# Patient Record
Sex: MALE | Race: WHITE | Employment: FULL TIME
[De-identification: names, ages, dates, MRNs, and addresses within clinical notes are randomized per-mention and may not be internally consistent; named-entity substitution may affect disease eponyms.]

---

## 2023-07-04 ENCOUNTER — APPOINTMENT (OUTPATIENT)
Facility: HOSPITAL | Age: 28
DRG: 300 | End: 2023-07-04

## 2023-07-04 ENCOUNTER — HOSPITAL ENCOUNTER (INPATIENT)
Facility: HOSPITAL | Age: 28
LOS: 1 days | Discharge: HOME | DRG: 300 | End: 2023-07-05
Attending: EMERGENCY MEDICINE | Admitting: INTERNAL MEDICINE

## 2023-07-04 DIAGNOSIS — I82.411 ACUTE DEEP VEIN THROMBOSIS (DVT) OF FEMORAL VEIN OF RIGHT LOWER EXTREMITY (HCC): Primary | ICD-10-CM

## 2023-07-04 LAB
ANION GAP SERPL CALC-SCNC: 5 MMOL/L (ref 5–15)
APTT PPP: 27.1 SEC (ref 22.1–31)
BUN SERPL-MCNC: 17 MG/DL (ref 6–20)
BUN/CREAT SERPL: 11 (ref 12–20)
CALCIUM SERPL-MCNC: 9.2 MG/DL (ref 8.5–10.1)
CHLORIDE SERPL-SCNC: 104 MMOL/L (ref 97–108)
CO2 SERPL-SCNC: 28 MMOL/L (ref 21–32)
COMMENT:: NORMAL
CREAT SERPL-MCNC: 1.49 MG/DL (ref 0.7–1.3)
ECHO BSA: 2.17 M2
ERYTHROCYTE [DISTWIDTH] IN BLOOD BY AUTOMATED COUNT: 13.4 % (ref 11.5–14.5)
GLUCOSE SERPL-MCNC: 115 MG/DL (ref 65–100)
HCT VFR BLD AUTO: 41.4 % (ref 36.6–50.3)
HGB BLD-MCNC: 13.4 G/DL (ref 12.1–17)
INR PPP: 1.1 (ref 0.9–1.1)
MCH RBC QN AUTO: 30.9 PG (ref 26–34)
MCHC RBC AUTO-ENTMCNC: 32.4 G/DL (ref 30–36.5)
MCV RBC AUTO: 95.4 FL (ref 80–99)
NRBC # BLD: 0 K/UL (ref 0–0.01)
NRBC BLD-RTO: 0 PER 100 WBC
NT PRO BNP: 122 PG/ML
PLATELET # BLD AUTO: 168 K/UL (ref 150–400)
PMV BLD AUTO: 9.9 FL (ref 8.9–12.9)
POTASSIUM SERPL-SCNC: 4.4 MMOL/L (ref 3.5–5.1)
PROTHROMBIN TIME: 11.3 SEC (ref 9–11.1)
RBC # BLD AUTO: 4.34 M/UL (ref 4.1–5.7)
SODIUM SERPL-SCNC: 137 MMOL/L (ref 136–145)
SPECIMEN HOLD: NORMAL
THERAPEUTIC RANGE: NORMAL SECS (ref 58–77)
TROPONIN I SERPL HS-MCNC: 9 NG/L (ref 0–76)
UFH PPP CHRO-ACNC: 1.07 IU/ML
UFH PPP CHRO-ACNC: <0.1 IU/ML
WBC # BLD AUTO: 12.3 K/UL (ref 4.1–11.1)

## 2023-07-04 PROCEDURE — 2580000003 HC RX 258: Performed by: INTERNAL MEDICINE

## 2023-07-04 PROCEDURE — 85520 HEPARIN ASSAY: CPT

## 2023-07-04 PROCEDURE — 80048 BASIC METABOLIC PNL TOTAL CA: CPT

## 2023-07-04 PROCEDURE — 93971 EXTREMITY STUDY: CPT

## 2023-07-04 PROCEDURE — 1100000003 HC PRIVATE W/ TELEMETRY

## 2023-07-04 PROCEDURE — 85730 THROMBOPLASTIN TIME PARTIAL: CPT

## 2023-07-04 PROCEDURE — 84484 ASSAY OF TROPONIN QUANT: CPT

## 2023-07-04 PROCEDURE — 83880 ASSAY OF NATRIURETIC PEPTIDE: CPT

## 2023-07-04 PROCEDURE — 85027 COMPLETE CBC AUTOMATED: CPT

## 2023-07-04 PROCEDURE — 6360000002 HC RX W HCPCS: Performed by: INTERNAL MEDICINE

## 2023-07-04 PROCEDURE — 36415 COLL VENOUS BLD VENIPUNCTURE: CPT

## 2023-07-04 PROCEDURE — 85610 PROTHROMBIN TIME: CPT

## 2023-07-04 PROCEDURE — 99285 EMERGENCY DEPT VISIT HI MDM: CPT

## 2023-07-04 RX ORDER — ACETAMINOPHEN 650 MG/1
650 SUPPOSITORY RECTAL EVERY 6 HOURS PRN
Status: DISCONTINUED | OUTPATIENT
Start: 2023-07-04 | End: 2023-07-05 | Stop reason: HOSPADM

## 2023-07-04 RX ORDER — ONDANSETRON 2 MG/ML
4 INJECTION INTRAMUSCULAR; INTRAVENOUS EVERY 6 HOURS PRN
Status: DISCONTINUED | OUTPATIENT
Start: 2023-07-04 | End: 2023-07-05 | Stop reason: HOSPADM

## 2023-07-04 RX ORDER — HEPARIN SODIUM 10000 [USP'U]/100ML
18-30 INJECTION, SOLUTION INTRAVENOUS CONTINUOUS
Status: DISCONTINUED | OUTPATIENT
Start: 2023-07-04 | End: 2023-07-05

## 2023-07-04 RX ORDER — HEPARIN SODIUM 1000 [USP'U]/ML
80 INJECTION, SOLUTION INTRAVENOUS; SUBCUTANEOUS ONCE
Status: COMPLETED | OUTPATIENT
Start: 2023-07-04 | End: 2023-07-04

## 2023-07-04 RX ORDER — POLYETHYLENE GLYCOL 3350 17 G/17G
17 POWDER, FOR SOLUTION ORAL DAILY PRN
Status: DISCONTINUED | OUTPATIENT
Start: 2023-07-04 | End: 2023-07-05 | Stop reason: HOSPADM

## 2023-07-04 RX ORDER — SODIUM CHLORIDE 9 MG/ML
INJECTION, SOLUTION INTRAVENOUS PRN
Status: DISCONTINUED | OUTPATIENT
Start: 2023-07-04 | End: 2023-07-05 | Stop reason: HOSPADM

## 2023-07-04 RX ORDER — HEPARIN SODIUM 1000 [USP'U]/ML
80 INJECTION, SOLUTION INTRAVENOUS; SUBCUTANEOUS PRN
Status: DISCONTINUED | OUTPATIENT
Start: 2023-07-04 | End: 2023-07-05

## 2023-07-04 RX ORDER — ONDANSETRON 4 MG/1
4 TABLET, ORALLY DISINTEGRATING ORAL EVERY 8 HOURS PRN
Status: DISCONTINUED | OUTPATIENT
Start: 2023-07-04 | End: 2023-07-05 | Stop reason: HOSPADM

## 2023-07-04 RX ORDER — SODIUM CHLORIDE 0.9 % (FLUSH) 0.9 %
5-40 SYRINGE (ML) INJECTION EVERY 12 HOURS SCHEDULED
Status: DISCONTINUED | OUTPATIENT
Start: 2023-07-04 | End: 2023-07-05 | Stop reason: HOSPADM

## 2023-07-04 RX ORDER — SODIUM CHLORIDE 0.9 % (FLUSH) 0.9 %
5-40 SYRINGE (ML) INJECTION PRN
Status: DISCONTINUED | OUTPATIENT
Start: 2023-07-04 | End: 2023-07-05 | Stop reason: HOSPADM

## 2023-07-04 RX ORDER — ACETAMINOPHEN 325 MG/1
650 TABLET ORAL EVERY 6 HOURS PRN
Status: DISCONTINUED | OUTPATIENT
Start: 2023-07-04 | End: 2023-07-05 | Stop reason: HOSPADM

## 2023-07-04 RX ORDER — DEXTROAMPHETAMINE SACCHARATE, AMPHETAMINE ASPARTATE, DEXTROAMPHETAMINE SULFATE AND AMPHETAMINE SULFATE 5; 5; 5; 5 MG/1; MG/1; MG/1; MG/1
20 TABLET ORAL 3 TIMES DAILY
COMMUNITY

## 2023-07-04 RX ORDER — HEPARIN SODIUM 1000 [USP'U]/ML
40 INJECTION, SOLUTION INTRAVENOUS; SUBCUTANEOUS PRN
Status: DISCONTINUED | OUTPATIENT
Start: 2023-07-04 | End: 2023-07-05

## 2023-07-04 RX ADMIN — HEPARIN SODIUM AND DEXTROSE 18 UNITS/KG/HR: 10000; 5 INJECTION INTRAVENOUS at 17:05

## 2023-07-04 RX ADMIN — SODIUM CHLORIDE, PRESERVATIVE FREE 10 ML: 5 INJECTION INTRAVENOUS at 21:00

## 2023-07-04 RX ADMIN — HEPARIN SODIUM 8000 UNITS: 1000 INJECTION INTRAVENOUS; SUBCUTANEOUS at 17:05

## 2023-07-04 ASSESSMENT — PAIN SCALES - GENERAL
PAINLEVEL_OUTOF10: 8
PAINLEVEL_OUTOF10: 8
PAINLEVEL_OUTOF10: 7
PAINLEVEL_OUTOF10: 7
PAINLEVEL_OUTOF10: 8

## 2023-07-04 ASSESSMENT — ENCOUNTER SYMPTOMS: SHORTNESS OF BREATH: 0

## 2023-07-04 NOTE — ED NOTES
Verbal shift change report given to [address] [place] [address] (oncoming nurse) by Nieves Reyna (offgoing nurse). Report included the following information Nurse Handoff Report, ED Encounter Summary, ED SBAR, [facility], Recent Results, and Cardiac Rhythm NSR . ED RN to draw pre-heparin labs and initiate heparin gtt prior to transport. Confirmed with Kyle Daniel in pharmacy to give 8,000 u bolus of heparin and initiate gtt at 18 units/kg/hr.        Acosta Rodriguez, RN  07/04/23 [id number] Servando Arora RN  07/04/23 1006

## 2023-07-04 NOTE — ED PROVIDER NOTES
EMERGENCY DEPARTMENT HISTORY AND PHYSICAL EXAM    Date: 7/4/2023  Patient Name: Wan Diaz  Patient Age and Sex: 33 y.o. male  MRN:  [id number]  CSN:  [id number]    History of Present Illness     Chief Complaint   Patient presents with    Leg Swelling     Noticed swelling, tightness and tenderness to R thigh last night; reports family hx blood clots; denies trauma/injury       History Provided By: Patient    Ability to gather history was limited by:     HPI: Wan Diaz, 33 y.o. male   With family history of DVT including in his brother and his father, complains of swelling throughout the right leg which started about 1 day ago, now involving the entire right leg, which feels swollen and firm. Mild pain. No shortness of breath. No recent injuries or any DVT risk factors other than family history. Tobacco Use      Smoking status: Some Days        Types: Cigarettes      Smokeless tobacco: Never     Past History   The patient's medical, surgical, and social history were reviewed by me today. Current Medications:  No current facility-administered medications on file prior to encounter. Current Outpatient Medications on File Prior to Encounter   Medication Sig Dispense Refill    amphetamine-dextroamphetamine (ADDERALL) 20 MG tablet Take 1 tablet by mouth 3 times daily. Max Daily Amount: 60 mg         No past medical history on file. No past surgical history on file. Social History     Tobacco Use    Smoking status: Some Days     Types: Cigarettes    Smokeless tobacco: Never   Substance Use Topics    Alcohol use: Yes     Alcohol/week: 9.0 standard drinks     Types: 9 Cans of beer per week    Drug use: Never       Allergies:  No Known Allergies  Review of Systems   A Review of Systems was reviewed by me today during this encounter. Pertinent positive and negative elements are noted in the HPI and MDM sections. Review of Systems   Respiratory:  Negative for shortness of breath.

## 2023-07-04 NOTE — PROGRESS NOTES
Patient transferred to [facility] from [address] [place]. Patient currently refused CTA due to no active insurance. Patient would like to talk with md about scan prior to getting it completed out of pocket. MD notified and will talk to him. 1810: Rate verified the heparin drip at 18 units/ kg/ hr.     Patient currently resting in bed. VSS. Reports pain of 8/10 but declined analgesia at this time. Patient sister bringing food for him for dinner. Patient requested to continue his home adderall prescription. Patient sister willing to bring home supply to hospital for him. Patient hoping to avoid withdrawal symptoms from not having it. Notified MD.     1763: Went into patient room to begin admission database. Patient and sister just now starting to eat dinner. Sister offered to leave room, I said there is no rush, we can complete it later. End of Shift Note    Bedside shift change report given to [address] [place] [address] (oncoming nurse) by Elizabeth Lloyd RN (offgoing nurse). Report included the following information SBAR, MAR, Recent Results, and Cardiac Rhythm NSR    Shift worked:  7a-7p     Shift summary and any significant changes:     See above     Concerns for physician to address:  Patient declined CTA due to no current insurance coverage, would like to talk with md before completing it out of pocket. Patient would like to continue his adderall rx during hospital stay if possible. Zone phone for oncoming shift:          Activity:     Number times ambulated in hallways past shift: 0  Number of times OOB to chair past shift: 0    Cardiac:   Cardiac Monitoring: Yes           Access:  Current line(s): PIV     Genitourinary:   Urinary status: voiding    Respiratory:      Chronic home O2 use?: NO  Incentive spirometer at bedside: NO       GI:     Current diet:  ADULT DIET;  Regular  Passing flatus: YES  Tolerating current diet: YES       Pain Management:   Patient states pain is manageable on current regimen: YES    Skin:

## 2023-07-04 NOTE — H&P
Hospitalist Admission Note    NAME:   Wan Diaz   : 1995   MRN: [id number]     Date/Time: 2023 4:09 PM    Patient PCP: None None    ______________________________________________________________________  Given the patient's current clinical presentation, I have a high level of concern for decompensation if discharged from the emergency department. Complex decision making was performed, which includes reviewing the patient's available past medical records, laboratory results, and x-ray films. My assessment of this patient's clinical condition and my plan of care is as follows. Assessment / Plan:    Acute right leg DVT unprovoked  Family history of factor V Leyden mutation with DVTs in multiple members of the family  -Ultrasound of right leg positive for DVT. -We will check a stat CBC, CMP, troponin and also proBNP  -We will check CT angiogram of the chest as well  -Start heparin drip per protocol. Pharmacy consulted. -Patient will need a hypercoagulable panel in about 6 weeks times with hematology. We will consult hematology as well.  -He was counseled extensively regarding smoking cessation as it could increase the risk of blood clots    ADHD  -On Adderall at home        Medical Decision Making:   I personally reviewed labs: CBC, BMP  I personally reviewed imaging: Ultrasound Doppler of right leg  I personally reviewed EKG:  Toxic drug monitoring: Monitor hemoglobin while on heparin drip  Discussed case with: ED provider. After discussion I am in agreement that acuity of patient's medical condition necessitates hospital stay.       Code Status: Full code  DVT Prophylaxis: Heparin drip  GI Prophylaxis:  Baseline: From home, independent of ADLs    Subjective:   CHIEF COMPLAINT: Right leg pain and swelling    HISTORY OF PRESENT ILLNESS:     Wan Diaz is a 33 y.o.  male with PMHx significant for family history of factor V Kristen Florian is coming the hospital chief complaints of an onset of

## 2023-07-04 NOTE — ED NOTES
RN to CT to place secondary line for CTA. Pt requested to ask physician about CT scan as he is concerned because he does not currently have active insurance. CT delayed scan at this time.       Sedrick Goldman RN  07/04/23 3534

## 2023-07-05 VITALS
OXYGEN SATURATION: 94 % | TEMPERATURE: 98.8 F | SYSTOLIC BLOOD PRESSURE: 135 MMHG | WEIGHT: 220 LBS | BODY MASS INDEX: 34.53 KG/M2 | HEIGHT: 67 IN | HEART RATE: 90 BPM | DIASTOLIC BLOOD PRESSURE: 94 MMHG | RESPIRATION RATE: 18 BRPM

## 2023-07-05 LAB
ANION GAP SERPL CALC-SCNC: 6 MMOL/L (ref 5–15)
BASOPHILS # BLD: 0.1 K/UL (ref 0–0.1)
BASOPHILS NFR BLD: 1 % (ref 0–1)
BUN SERPL-MCNC: 16 MG/DL (ref 6–20)
BUN/CREAT SERPL: 11 (ref 12–20)
CALCIUM SERPL-MCNC: 9.1 MG/DL (ref 8.5–10.1)
CHLORIDE SERPL-SCNC: 104 MMOL/L (ref 97–108)
CO2 SERPL-SCNC: 24 MMOL/L (ref 21–32)
CREAT SERPL-MCNC: 1.45 MG/DL (ref 0.7–1.3)
DIFFERENTIAL METHOD BLD: ABNORMAL
EOSINOPHIL # BLD: 0.4 K/UL (ref 0–0.4)
EOSINOPHIL NFR BLD: 3 % (ref 0–7)
ERYTHROCYTE [DISTWIDTH] IN BLOOD BY AUTOMATED COUNT: 13.7 % (ref 11.5–14.5)
GLUCOSE SERPL-MCNC: 115 MG/DL (ref 65–100)
HCT VFR BLD AUTO: 41.2 % (ref 36.6–50.3)
HGB BLD-MCNC: 13.2 G/DL (ref 12.1–17)
IMM GRANULOCYTES # BLD AUTO: 0.2 K/UL (ref 0–0.04)
IMM GRANULOCYTES NFR BLD AUTO: 2 % (ref 0–0.5)
LYMPHOCYTES # BLD: 1.8 K/UL (ref 0.8–3.5)
LYMPHOCYTES NFR BLD: 15 % (ref 12–49)
MCH RBC QN AUTO: 30.5 PG (ref 26–34)
MCHC RBC AUTO-ENTMCNC: 32 G/DL (ref 30–36.5)
MCV RBC AUTO: 95.2 FL (ref 80–99)
MONOCYTES # BLD: 1.5 K/UL (ref 0–1)
MONOCYTES NFR BLD: 12 % (ref 5–13)
NEUTS SEG # BLD: 8.4 K/UL (ref 1.8–8)
NEUTS SEG NFR BLD: 67 % (ref 32–75)
NRBC # BLD: 0 K/UL (ref 0–0.01)
NRBC BLD-RTO: 0 PER 100 WBC
PLATELET # BLD AUTO: 177 K/UL (ref 150–400)
PMV BLD AUTO: 10.1 FL (ref 8.9–12.9)
POTASSIUM SERPL-SCNC: 4.1 MMOL/L (ref 3.5–5.1)
RBC # BLD AUTO: 4.33 M/UL (ref 4.1–5.7)
SODIUM SERPL-SCNC: 134 MMOL/L (ref 136–145)
UFH PPP CHRO-ACNC: 0.39 IU/ML
WBC # BLD AUTO: 12.3 K/UL (ref 4.1–11.1)

## 2023-07-05 PROCEDURE — 2580000003 HC RX 258: Performed by: INTERNAL MEDICINE

## 2023-07-05 PROCEDURE — 6370000000 HC RX 637 (ALT 250 FOR IP): Performed by: INTERNAL MEDICINE

## 2023-07-05 PROCEDURE — 36415 COLL VENOUS BLD VENIPUNCTURE: CPT

## 2023-07-05 PROCEDURE — 85025 COMPLETE CBC W/AUTO DIFF WBC: CPT

## 2023-07-05 PROCEDURE — 85520 HEPARIN ASSAY: CPT

## 2023-07-05 PROCEDURE — 80048 BASIC METABOLIC PNL TOTAL CA: CPT

## 2023-07-05 PROCEDURE — 6360000002 HC RX W HCPCS: Performed by: INTERNAL MEDICINE

## 2023-07-05 RX ADMIN — SODIUM CHLORIDE, PRESERVATIVE FREE 10 ML: 5 INJECTION INTRAVENOUS at 11:20

## 2023-07-05 RX ADMIN — HEPARIN SODIUM AND DEXTROSE 15 UNITS/KG/HR: 10000; 5 INJECTION INTRAVENOUS at 08:09

## 2023-07-05 RX ADMIN — APIXABAN 10 MG: 5 TABLET, FILM COATED ORAL at 11:17

## 2023-07-05 ASSESSMENT — PAIN SCALES - GENERAL
PAINLEVEL_OUTOF10: 0
PAINLEVEL_OUTOF10: 5

## 2023-07-05 NOTE — CONSULTS
Hematology Oncology Consultation    Reason for consult: DVT    Admitting Diagnosis: Acute deep vein thrombosis (DVT) of femoral vein of right lower extremity (HCC) [I82.411]  Acute deep vein thrombosis (DVT) of right femoral vein (HCC) [I82.411]    Impression:   *) Unprovoked right proximal DVT:  - + family history of clotting events and +FH factor V Leiden so suspect he has high risk for familial d/o.  - unprovoked  - need to stop smoking given increased clotting risk which we discussed today  - Will do outpt HC w/u-gave him my card and will need him to FU with me in 2-3 months  - Transition to [address] upon DC and consult CM to see if any problems with insurance w/ [facility]  - FU with me 8/28/23 at 2: 15pm and in [address] home today, discussed with Dr Dwayne Sandra    Discussion: Ralph Sánchez is a  33y.o. year old seen in consultation at the request of Dr. Kirti Dc for unprovoked DVT.  [facility] smokes 1/2ppd and on 7/3 noted right ankle swelling then woke 7/4 in am with right entire leg swelling, pain warmth in rt groin prompting ED visit. Doppler showed proximal extensive dvt in rigth Common femoral vein from proximal to distal section. He is on IV heparin feeling better, swelling a little better. States Dad and brother have had dvt/pe and have factor V. Denies sob or CP. -Imaging-   7/4/23 Doppler:      Deep vein thrombosis in the right common femoral vein. Deep vein thrombosis in the right proximal femoral vein    Deep vein thrombosis in the right middle femoral vein. Deep vein thrombosis in the right distal femoral vein. Xray Result (most recent):  No results found for this or any previous visit from the past 3650 days.       Labs:    Recent Results (from the past 24 hour(s))   Vascular duplex lower extremity venous right    Collection Time: 07/04/23  3:30 PM   Result Value Ref Range    Body Surface Area 2.17 m2   BMP    Collection Time: 07/04/23  4:46 PM   Result Value Ref Range    Sodium 137 136 -

## 2023-07-05 NOTE — PLAN OF CARE
Problem: Discharge Planning  Goal: Discharge to home or other facility with appropriate resources  Outcome: Progressing  Flowsheets (Taken 7/5/2023 1526)  Discharge to home or other facility with appropriate resources:   Identify barriers to discharge with patient and caregiver   Arrange for needed discharge resources and transportation as appropriate   Identify discharge learning needs (meds, wound care, etc)     Problem: Pain  Goal: Verbalizes/displays adequate comfort level or baseline comfort level  Outcome: Progressing  Flowsheets (Taken 7/5/2023 1526)  Verbalizes/displays adequate comfort level or baseline comfort level:   Encourage patient to monitor pain and request assistance   Assess pain using appropriate pain scale   Administer analgesics based on type and severity of pain and evaluate response   Implement non-pharmacological measures as appropriate and evaluate response     Problem: Safety - Adult  Goal: Free from fall injury  Outcome: Progressing  Flowsheets (Taken 7/5/2023 1526)  Free From Fall Injury:   Instruct family/caregiver on patient safety   Based on caregiver fall risk screen, instruct family/caregiver to ask for assistance with transferring infant if caregiver noted to have fall risk factors     Problem: ABCDS Injury Assessment  Goal: Absence of physical injury  Outcome: Progressing  Flowsheets (Taken 7/5/2023 1526)  Absence of Physical Injury: Implement safety measures based on patient assessment

## 2023-07-05 NOTE — PLAN OF CARE
Problem: Discharge Planning  Goal: Discharge to home or other facility with appropriate resources  Outcome: Progressing     Problem: Pain  Goal: Verbalizes/displays adequate comfort level or baseline comfort level  Outcome: Progressing  Flowsheets (Taken 7/4/2023 2137)  Verbalizes/displays adequate comfort level or baseline comfort level:   Encourage patient to monitor pain and request assistance   Implement non-pharmacological measures as appropriate and evaluate response   Administer analgesics based on type and severity of pain and evaluate response     Problem: Safety - Adult  Goal: Free from fall injury  Outcome: Progressing     Problem: ABCDS Injury Assessment  Goal: Absence of physical injury  Outcome: Progressing

## 2023-07-05 NOTE — PROGRESS NOTES
.7/5/2023        RE: Landon Jeong         [address] [address]          To Whom It May Concern,      Due to medical reasons, Landon Jeong may return to light work from July 10,2023.  Call my office any questions        Sincerely,      Aliyah Negrete MD

## 2023-07-05 NOTE — DISCHARGE SUMMARY
Discharge Summary    Name: Geovanni Guerrero  [id number]  YOB: 1995 (Age: 33 y.o.)   Date of Admission: 7/4/2023  Date of Discharge: 7/5/2023  Attending Physician: Rossy Sims MD    Discharge Diagnosis:   Acute DVT    Consultations:  IP CONSULT TO HOSPITALIST  IP CONSULT TO HEMATOLOGY      Brief Admission History/Reason for Admission Per Sita Ruffin MD:   Geovanni Guerrero is a 33 y.o.  male with PMHx significant for family history of factor [place] is coming the hospital chief complaints of an onset of right leg pain and swelling. Patient reports of right leg pain is about 7 x 10, increases to right foot and also reports swelling as well. Does report extensive history of blood clots in the family and also history of factor V Leiden mutation. Does not report any chest pain or shortness of breath. Does not report similar episodes in the past.  Does not report any abdominal pain, nausea or vomiting. Does not report any recent immobilization or long distance travel. On arrival to ED, vital signs are within normal limits. Plan work is pending. He had an ultrasound of her right leg which shows evidence of deep vein thrombosis in the right common femoral vein, middle and distal femoral vein and was started on heparin drip in the emergency department. We were asked to admit for work up and evaluation of the above problems. Brief Hospital Course by Main Problems:     Acute right leg DVT unprovoked  Family history of factor V Leyden mutation with DVTs in multiple members of the family  -Ultrasound of right leg positive for DVT. -We will check a stat CBC, CMP, troponin and also proBNP  -We will check CT angiogram of the chest as well  -Start heparin drip per protocol. Pharmacy consulted. -Patient will need a hypercoagulable panel in about 6 weeks times with hematology.   We will consult hematology as well.  -He was counseled extensively regarding smoking

## 2023-07-05 NOTE — PROGRESS NOTES
Bedside and Verbal shift change report given to [facility] (oncoming nurse) by Dashawn Moreno (offgoing nurse). Report included the following information Nurse Handoff Report, Index, MAR, Recent Results, and Cardiac Rhythm NSR .     1925: Rate verified the heparin drip at 18 units/ kg/ hr.    2350: Per MAR and verification with Pharmacist Tiffany Longest- stopped heparin infusion for 1 hour and then restart and decrease rate by 3 units/kg/hr. 0050: Heparin infusion restarted and decreased by 3 units/kg/hr. Dual verified. End of Shift Note    Bedside shift change report given to Lise Waters (oncoming nurse) by Alessandro Damon RN (offgoing nurse).   Report included the following information SBAR, MAR, Recent Results, and Cardiac Rhythm NSR    Shift worked:  7p-7a     Shift summary and any significant changes:     See above  No significant events   Concerns for physician to address:  Pt is requesting to continue PTA Adderall- pt's sister is willing to bring in home medication    Zone phone for oncoming shift:          Alessandro Damon RN

## 2023-07-05 NOTE — DISCHARGE INSTRUCTIONS
HOSPITALIST DISCHARGE INSTRUCTIONS    NAME: Jai Oakes   :  1995   MRN:  [id number]     Date/Time:  2023 3:26 PM    ADMIT DATE: 2023   DISCHARGE DATE: 2023     DVT      -Take Eliquis 10 mg BID for 7 days then take 5 mg BID    It is important that you take the medication exactly as they are prescribed. Keep your medication in the bottles provided by the pharmacist and keep a list of the medication names, dosages, and times to be taken in your wallet. Do not take other medications without consulting your doctor. What to do at Home    Recommended diet:  regular diet    Recommended activity: activity as tolerated      If you have questions regarding the hospital related prescriptions or hospital related issues please call [address] [place] [address] office at [phone]. You can always direct your questions to your primary care doctor if you are unable to reach your hospital physician; your PCP works as an extension of your hospital doctor just like your hospital doctor is an extension of your PCP for your time at the [facility], [facility])    If you experience any of the following symptoms then please call your primary care physician or return to the emergency room if you cannot get hold of your doctor:    Fever, chills, nausea, vomiting, or persistent diarrhea  Worsening weakness or new problems with your speech or balance  Dark stools or visible blood in your stools  New Leg swelling or shortness of breath as these could be signs of a clot    Additional Instructions:      Bring these papers with you to your follow up appointments. The papers will help your doctors be sure to continue the care plan from the hospital.              Information obtained by :  I understand that if any problems occur once I am at home I am to contact my physician. I understand and acknowledge receipt of the instructions indicated above.

## 2023-07-05 NOTE — CARE COORDINATION
Patient is clear from a CM standpoint. Care Management Initial Assessment       RUR: 6% (low RUR)  Readmission? No  1st IM letter given? No  1st  letter given: No      07/05/23 1530   Service Assessment   Patient Orientation Alert and Oriented   Cognition Alert   History Provided By Patient   Primary Caregiver Self   Accompanied By/Relationship Father   Support Systems Family Members;Parent   Patient's Healthcare Decision Maker is: Legal Next of [facility]   PCP Verified by CM No  (needs new PCP; was going to [facility] previously)   Prior Functional Level Independent in ADLs/IADLs   Current Functional Level Independent in ADLs/IADLs   Can patient return to prior living arrangement Yes   Ability to make needs known: Good   Family able to assist with home care needs: Yes   Would you like for me to discuss the discharge plan with any other family members/significant others, and if so, who? No   Financial Resources None   [employer] None   Social/Functional History   Lives With Family  (lives with sister)   Type of [address] [place] [address] Two level  (6 JACOBY, 14 steps to the next level)   Home Equipment None   Active  Yes   Mode of Transportation Car   Discharge Planning   Type of Residence House   Living Arrangements Spouse/Significant Other   Current Services Prior To Admission None   Potential Assistance Needed N/A   DME Ordered? No   Potential Assistance Purchasing Medications No   Type of Home Care Services None   Patient expects to be discharged to: [facility]   Transition of Care Consult (CM Consult) N/A   Services At/After Discharge None   Mode of Transport at Discharge Self  (Family at bedside)   Confirm Follow Up Transport Self     CM received call from attending confirming that patient needs 30 day free trial card for eliquis.   CM stated that during IDRs patient does not have insurance and would not be eligible for the 30-day free trial card, as this is for